# Patient Record
Sex: FEMALE | Race: WHITE | Employment: FULL TIME | URBAN - METROPOLITAN AREA
[De-identification: names, ages, dates, MRNs, and addresses within clinical notes are randomized per-mention and may not be internally consistent; named-entity substitution may affect disease eponyms.]

---

## 2022-11-22 ENCOUNTER — APPOINTMENT (OUTPATIENT)
Dept: GENERAL RADIOLOGY | Age: 22
End: 2022-11-22

## 2022-11-22 ENCOUNTER — HOSPITAL ENCOUNTER (EMERGENCY)
Age: 22
Discharge: ANOTHER HOSPITAL | End: 2022-11-23
Attending: EMERGENCY MEDICINE

## 2022-11-22 DIAGNOSIS — E05.91 THYROTOXICOSIS WITH THYROTOXIC CRISIS, UNSPECIFIED THYROTOXICOSIS TYPE: Primary | ICD-10-CM

## 2022-11-22 DIAGNOSIS — E87.6 HYPOKALEMIA: ICD-10-CM

## 2022-11-22 LAB
ALBUMIN SERPL-MCNC: 3.9 G/DL (ref 3.5–5.2)
ALP BLD-CCNC: 123 U/L (ref 35–104)
ALT SERPL-CCNC: 18 U/L (ref 0–32)
ANION GAP SERPL CALCULATED.3IONS-SCNC: 15 MMOL/L (ref 7–16)
AST SERPL-CCNC: 17 U/L (ref 0–31)
BASOPHILS ABSOLUTE: 0.04 E9/L (ref 0–0.2)
BASOPHILS RELATIVE PERCENT: 0.4 % (ref 0–2)
BILIRUB SERPL-MCNC: 0.4 MG/DL (ref 0–1.2)
BUN BLDV-MCNC: 12 MG/DL (ref 6–20)
CALCIUM SERPL-MCNC: 10 MG/DL (ref 8.6–10.2)
CHLORIDE BLD-SCNC: 103 MMOL/L (ref 98–107)
CO2: 19 MMOL/L (ref 22–29)
CREAT SERPL-MCNC: 0.4 MG/DL (ref 0.5–1)
EOSINOPHILS ABSOLUTE: 0.1 E9/L (ref 0.05–0.5)
EOSINOPHILS RELATIVE PERCENT: 1 % (ref 0–6)
GFR SERPL CREATININE-BSD FRML MDRD: >60 ML/MIN/1.73
GLUCOSE BLD-MCNC: 96 MG/DL (ref 74–99)
HCT VFR BLD CALC: 37.5 % (ref 34–48)
HEMOGLOBIN: 12.6 G/DL (ref 11.5–15.5)
IMMATURE GRANULOCYTES #: 0.03 E9/L
IMMATURE GRANULOCYTES %: 0.3 % (ref 0–5)
LYMPHOCYTES ABSOLUTE: 3.82 E9/L (ref 1.5–4)
LYMPHOCYTES RELATIVE PERCENT: 38.9 % (ref 20–42)
MAGNESIUM: 1.7 MG/DL (ref 1.6–2.6)
MCH RBC QN AUTO: 25.8 PG (ref 26–35)
MCHC RBC AUTO-ENTMCNC: 33.6 % (ref 32–34.5)
MCV RBC AUTO: 76.8 FL (ref 80–99.9)
MONOCYTES ABSOLUTE: 1.25 E9/L (ref 0.1–0.95)
MONOCYTES RELATIVE PERCENT: 12.7 % (ref 2–12)
NEUTROPHILS ABSOLUTE: 4.57 E9/L (ref 1.8–7.3)
NEUTROPHILS RELATIVE PERCENT: 46.7 % (ref 43–80)
PDW BLD-RTO: 13.1 FL (ref 11.5–15)
PLATELET # BLD: 315 E9/L (ref 130–450)
PMV BLD AUTO: 11 FL (ref 7–12)
POTASSIUM SERPL-SCNC: 2.7 MMOL/L (ref 3.5–5)
PRO-BNP: 178 PG/ML (ref 0–125)
RBC # BLD: 4.88 E12/L (ref 3.5–5.5)
SODIUM BLD-SCNC: 137 MMOL/L (ref 132–146)
T4 FREE: 7.03 NG/DL (ref 0.93–1.7)
TOTAL PROTEIN: 7.6 G/DL (ref 6.4–8.3)
TROPONIN, HIGH SENSITIVITY: <6 NG/L (ref 0–9)
TSH SERPL DL<=0.05 MIU/L-ACNC: <0.01 UIU/ML (ref 0.27–4.2)
WBC # BLD: 9.8 E9/L (ref 4.5–11.5)

## 2022-11-22 PROCEDURE — 93005 ELECTROCARDIOGRAM TRACING: CPT | Performed by: EMERGENCY MEDICINE

## 2022-11-22 PROCEDURE — 83735 ASSAY OF MAGNESIUM: CPT

## 2022-11-22 PROCEDURE — 85025 COMPLETE CBC W/AUTO DIFF WBC: CPT

## 2022-11-22 PROCEDURE — 71045 X-RAY EXAM CHEST 1 VIEW: CPT

## 2022-11-22 PROCEDURE — 80053 COMPREHEN METABOLIC PANEL: CPT

## 2022-11-22 PROCEDURE — 96361 HYDRATE IV INFUSION ADD-ON: CPT

## 2022-11-22 PROCEDURE — 84484 ASSAY OF TROPONIN QUANT: CPT

## 2022-11-22 PROCEDURE — 6370000000 HC RX 637 (ALT 250 FOR IP): Performed by: EMERGENCY MEDICINE

## 2022-11-22 PROCEDURE — 83880 ASSAY OF NATRIURETIC PEPTIDE: CPT

## 2022-11-22 PROCEDURE — 6360000002 HC RX W HCPCS: Performed by: EMERGENCY MEDICINE

## 2022-11-22 PROCEDURE — 84443 ASSAY THYROID STIM HORMONE: CPT

## 2022-11-22 PROCEDURE — 96376 TX/PRO/DX INJ SAME DRUG ADON: CPT

## 2022-11-22 PROCEDURE — 84439 ASSAY OF FREE THYROXINE: CPT

## 2022-11-22 PROCEDURE — 2580000003 HC RX 258: Performed by: EMERGENCY MEDICINE

## 2022-11-22 PROCEDURE — 36415 COLL VENOUS BLD VENIPUNCTURE: CPT

## 2022-11-22 PROCEDURE — 96365 THER/PROPH/DIAG IV INF INIT: CPT

## 2022-11-22 PROCEDURE — 99285 EMERGENCY DEPT VISIT HI MDM: CPT

## 2022-11-22 PROCEDURE — 96375 TX/PRO/DX INJ NEW DRUG ADDON: CPT

## 2022-11-22 RX ORDER — 0.9 % SODIUM CHLORIDE 0.9 %
1000 INTRAVENOUS SOLUTION INTRAVENOUS ONCE
Status: COMPLETED | OUTPATIENT
Start: 2022-11-22 | End: 2022-11-22

## 2022-11-22 RX ORDER — POTASSIUM CHLORIDE 7.45 MG/ML
10 INJECTION INTRAVENOUS
Status: DISPENSED | OUTPATIENT
Start: 2022-11-22 | End: 2022-11-23

## 2022-11-22 RX ORDER — PROPYLTHIOURACIL 50 MG/1
200 TABLET ORAL ONCE
Status: COMPLETED | OUTPATIENT
Start: 2022-11-22 | End: 2022-11-23

## 2022-11-22 RX ORDER — POTASSIUM CHLORIDE 20 MEQ/1
40 TABLET, EXTENDED RELEASE ORAL ONCE
Status: COMPLETED | OUTPATIENT
Start: 2022-11-22 | End: 2022-11-22

## 2022-11-22 RX ORDER — ONDANSETRON 2 MG/ML
4 INJECTION INTRAMUSCULAR; INTRAVENOUS ONCE
Status: COMPLETED | OUTPATIENT
Start: 2022-11-22 | End: 2022-11-22

## 2022-11-22 RX ADMIN — ONDANSETRON 4 MG: 2 INJECTION INTRAMUSCULAR; INTRAVENOUS at 23:59

## 2022-11-22 RX ADMIN — POTASSIUM CHLORIDE 10 MEQ: 7.46 INJECTION, SOLUTION INTRAVENOUS at 23:33

## 2022-11-22 RX ADMIN — POTASSIUM CHLORIDE 40 MEQ: 1500 TABLET, EXTENDED RELEASE ORAL at 22:17

## 2022-11-22 RX ADMIN — SODIUM CHLORIDE 1000 ML: 9 INJECTION, SOLUTION INTRAVENOUS at 21:26

## 2022-11-22 RX ADMIN — POTASSIUM CHLORIDE 10 MEQ: 7.46 INJECTION, SOLUTION INTRAVENOUS at 22:17

## 2022-11-22 ASSESSMENT — ENCOUNTER SYMPTOMS
SHORTNESS OF BREATH: 0
BACK PAIN: 0
COUGH: 0
ABDOMINAL PAIN: 0
VOMITING: 0
NAUSEA: 1
COLOR CHANGE: 0
DIARRHEA: 1
RHINORRHEA: 0
BLOOD IN STOOL: 0

## 2022-11-22 ASSESSMENT — LIFESTYLE VARIABLES
HOW MANY STANDARD DRINKS CONTAINING ALCOHOL DO YOU HAVE ON A TYPICAL DAY: PATIENT DOES NOT DRINK
HOW OFTEN DO YOU HAVE A DRINK CONTAINING ALCOHOL: NEVER

## 2022-11-23 ENCOUNTER — APPOINTMENT (OUTPATIENT)
Dept: ULTRASOUND IMAGING | Age: 22
DRG: 645 | End: 2022-11-23
Attending: STUDENT IN AN ORGANIZED HEALTH CARE EDUCATION/TRAINING PROGRAM

## 2022-11-23 ENCOUNTER — HOSPITAL ENCOUNTER (INPATIENT)
Age: 22
LOS: 1 days | Discharge: HOME | DRG: 645 | End: 2022-11-24
Attending: STUDENT IN AN ORGANIZED HEALTH CARE EDUCATION/TRAINING PROGRAM | Admitting: STUDENT IN AN ORGANIZED HEALTH CARE EDUCATION/TRAINING PROGRAM

## 2022-11-23 VITALS
OXYGEN SATURATION: 99 % | DIASTOLIC BLOOD PRESSURE: 79 MMHG | SYSTOLIC BLOOD PRESSURE: 134 MMHG | HEIGHT: 56 IN | TEMPERATURE: 98.4 F | HEART RATE: 120 BPM | BODY MASS INDEX: 36.89 KG/M2 | RESPIRATION RATE: 18 BRPM | WEIGHT: 164 LBS

## 2022-11-23 PROBLEM — E05.90 THYROTOXICOSIS, ACUTE: Status: ACTIVE | Noted: 2022-11-23

## 2022-11-23 PROBLEM — E05.80 OTHER THYROTOXICOSIS WITHOUT THYROTOXIC CRISIS OR STORM: Status: ACTIVE | Noted: 2022-11-23

## 2022-11-23 LAB
AMPHETAMINE SCREEN, URINE: NOT DETECTED
ANION GAP SERPL CALCULATED.3IONS-SCNC: 10 MMOL/L (ref 7–16)
BARBITURATE SCREEN URINE: NOT DETECTED
BENZODIAZEPINE SCREEN, URINE: NOT DETECTED
BILIRUBIN URINE: NEGATIVE
BLOOD, URINE: NEGATIVE
BUN BLDV-MCNC: 6 MG/DL (ref 6–20)
CALCIUM SERPL-MCNC: 9.8 MG/DL (ref 8.6–10.2)
CANNABINOID SCREEN URINE: POSITIVE
CHLORIDE BLD-SCNC: 108 MMOL/L (ref 98–107)
CLARITY: CLEAR
CO2: 20 MMOL/L (ref 22–29)
COCAINE METABOLITE SCREEN URINE: NOT DETECTED
COLOR: YELLOW
CREAT SERPL-MCNC: 0.3 MG/DL (ref 0.5–1)
EKG ATRIAL RATE: 149 BPM
EKG P AXIS: 56 DEGREES
EKG P-R INTERVAL: 132 MS
EKG Q-T INTERVAL: 266 MS
EKG QRS DURATION: 80 MS
EKG QTC CALCULATION (BAZETT): 418 MS
EKG R AXIS: 6 DEGREES
EKG T AXIS: 43 DEGREES
EKG VENTRICULAR RATE: 149 BPM
FENTANYL SCREEN, URINE: NOT DETECTED
FERRITIN: 103 NG/ML
GFR SERPL CREATININE-BSD FRML MDRD: >60 ML/MIN/1.73
GLUCOSE BLD-MCNC: 138 MG/DL (ref 74–99)
GLUCOSE URINE: NEGATIVE MG/DL
HCG(URINE) PREGNANCY TEST: NEGATIVE
IRON SATURATION: 28 % (ref 15–50)
IRON: 90 MCG/DL (ref 37–145)
KETONES, URINE: 15 MG/DL
LEUKOCYTE ESTERASE, URINE: NEGATIVE
Lab: ABNORMAL
METHADONE SCREEN, URINE: NOT DETECTED
NITRITE, URINE: NEGATIVE
OPIATE SCREEN URINE: NOT DETECTED
OXYCODONE URINE: NOT DETECTED
PH UA: 6 (ref 5–9)
PHENCYCLIDINE SCREEN URINE: NOT DETECTED
POTASSIUM SERPL-SCNC: 3.8 MMOL/L (ref 3.5–5)
PROTEIN UA: NEGATIVE MG/DL
SODIUM BLD-SCNC: 138 MMOL/L (ref 132–146)
SPECIFIC GRAVITY UA: <=1.005 (ref 1–1.03)
TOTAL IRON BINDING CAPACITY: 316 MCG/DL (ref 250–450)
UROBILINOGEN, URINE: 0.2 E.U./DL

## 2022-11-23 PROCEDURE — 99253 IP/OBS CNSLTJ NEW/EST LOW 45: CPT | Performed by: INTERNAL MEDICINE

## 2022-11-23 PROCEDURE — 83540 ASSAY OF IRON: CPT

## 2022-11-23 PROCEDURE — 81025 URINE PREGNANCY TEST: CPT

## 2022-11-23 PROCEDURE — 6370000000 HC RX 637 (ALT 250 FOR IP): Performed by: EMERGENCY MEDICINE

## 2022-11-23 PROCEDURE — 80307 DRUG TEST PRSMV CHEM ANLYZR: CPT

## 2022-11-23 PROCEDURE — 80048 BASIC METABOLIC PNL TOTAL CA: CPT

## 2022-11-23 PROCEDURE — 83550 IRON BINDING TEST: CPT

## 2022-11-23 PROCEDURE — 99223 1ST HOSP IP/OBS HIGH 75: CPT | Performed by: STUDENT IN AN ORGANIZED HEALTH CARE EDUCATION/TRAINING PROGRAM

## 2022-11-23 PROCEDURE — 81003 URINALYSIS AUTO W/O SCOPE: CPT

## 2022-11-23 PROCEDURE — 2580000003 HC RX 258: Performed by: STUDENT IN AN ORGANIZED HEALTH CARE EDUCATION/TRAINING PROGRAM

## 2022-11-23 PROCEDURE — 36415 COLL VENOUS BLD VENIPUNCTURE: CPT

## 2022-11-23 PROCEDURE — 2060000000 HC ICU INTERMEDIATE R&B

## 2022-11-23 PROCEDURE — 6370000000 HC RX 637 (ALT 250 FOR IP): Performed by: STUDENT IN AN ORGANIZED HEALTH CARE EDUCATION/TRAINING PROGRAM

## 2022-11-23 PROCEDURE — 6360000002 HC RX W HCPCS: Performed by: EMERGENCY MEDICINE

## 2022-11-23 PROCEDURE — 6360000002 HC RX W HCPCS: Performed by: STUDENT IN AN ORGANIZED HEALTH CARE EDUCATION/TRAINING PROGRAM

## 2022-11-23 PROCEDURE — 93010 ELECTROCARDIOGRAM REPORT: CPT | Performed by: INTERNAL MEDICINE

## 2022-11-23 PROCEDURE — 76536 US EXAM OF HEAD AND NECK: CPT

## 2022-11-23 PROCEDURE — 82728 ASSAY OF FERRITIN: CPT

## 2022-11-23 RX ORDER — ACETAMINOPHEN 325 MG/1
650 TABLET ORAL EVERY 4 HOURS PRN
Status: DISCONTINUED | OUTPATIENT
Start: 2022-11-23 | End: 2022-11-24 | Stop reason: HOSPADM

## 2022-11-23 RX ORDER — ONDANSETRON 2 MG/ML
4 INJECTION INTRAMUSCULAR; INTRAVENOUS ONCE
Status: COMPLETED | OUTPATIENT
Start: 2022-11-23 | End: 2022-11-23

## 2022-11-23 RX ORDER — ENOXAPARIN SODIUM 100 MG/ML
40 INJECTION SUBCUTANEOUS DAILY
Status: DISCONTINUED | OUTPATIENT
Start: 2022-11-23 | End: 2022-11-24 | Stop reason: HOSPADM

## 2022-11-23 RX ORDER — PROPYLTHIOURACIL 50 MG/1
100 TABLET ORAL EVERY 6 HOURS
Status: DISCONTINUED | OUTPATIENT
Start: 2022-11-23 | End: 2022-11-23 | Stop reason: HOSPADM

## 2022-11-23 RX ORDER — PROPYLTHIOURACIL 50 MG/1
100 TABLET ORAL EVERY 6 HOURS
Status: DISCONTINUED | OUTPATIENT
Start: 2022-11-23 | End: 2022-11-24

## 2022-11-23 RX ORDER — ONDANSETRON 2 MG/ML
4 INJECTION INTRAMUSCULAR; INTRAVENOUS EVERY 6 HOURS PRN
Status: DISCONTINUED | OUTPATIENT
Start: 2022-11-23 | End: 2022-11-24 | Stop reason: HOSPADM

## 2022-11-23 RX ORDER — PROPRANOLOL HYDROCHLORIDE 40 MG/1
60 TABLET ORAL EVERY 6 HOURS
Status: DISCONTINUED | OUTPATIENT
Start: 2022-11-23 | End: 2022-11-24 | Stop reason: HOSPADM

## 2022-11-23 RX ORDER — PROPRANOLOL HYDROCHLORIDE 20 MG/1
60 TABLET ORAL EVERY 6 HOURS
Status: DISCONTINUED | OUTPATIENT
Start: 2022-11-23 | End: 2022-11-23 | Stop reason: HOSPADM

## 2022-11-23 RX ADMIN — ONDANSETRON 4 MG: 2 INJECTION INTRAMUSCULAR; INTRAVENOUS at 04:06

## 2022-11-23 RX ADMIN — PROPRANOLOL HYDROCHLORIDE 60 MG: 40 TABLET ORAL at 11:46

## 2022-11-23 RX ADMIN — PROPRANOLOL HYDROCHLORIDE 60 MG: 20 TABLET ORAL at 01:05

## 2022-11-23 RX ADMIN — PROPRANOLOL HYDROCHLORIDE 60 MG: 40 TABLET ORAL at 17:47

## 2022-11-23 RX ADMIN — SODIUM CHLORIDE 100 MG: 9 INJECTION, SOLUTION INTRAVENOUS at 14:46

## 2022-11-23 RX ADMIN — PROPYLTHIOURACIL 100 MG: 50 TABLET ORAL at 11:46

## 2022-11-23 RX ADMIN — POTASSIUM CHLORIDE 10 MEQ: 7.46 INJECTION, SOLUTION INTRAVENOUS at 01:47

## 2022-11-23 RX ADMIN — PROPYLTHIOURACIL 200 MG: 50 TABLET ORAL at 01:04

## 2022-11-23 RX ADMIN — ACETAMINOPHEN 650 MG: 325 TABLET ORAL at 22:00

## 2022-11-23 RX ADMIN — SODIUM CHLORIDE 100 MG: 9 INJECTION, SOLUTION INTRAVENOUS at 22:00

## 2022-11-23 RX ADMIN — PROPYLTHIOURACIL 100 MG: 50 TABLET ORAL at 17:47

## 2022-11-23 ASSESSMENT — PAIN SCALES - GENERAL
PAINLEVEL_OUTOF10: 0
PAINLEVEL_OUTOF10: 7
PAINLEVEL_OUTOF10: 0
PAINLEVEL_OUTOF10: 2
PAINLEVEL_OUTOF10: 0

## 2022-11-23 ASSESSMENT — PAIN DESCRIPTION - FREQUENCY: FREQUENCY: INTERMITTENT

## 2022-11-23 ASSESSMENT — PAIN DESCRIPTION - ONSET: ONSET: GRADUAL

## 2022-11-23 ASSESSMENT — PAIN DESCRIPTION - PAIN TYPE: TYPE: ACUTE PAIN

## 2022-11-23 ASSESSMENT — PAIN - FUNCTIONAL ASSESSMENT: PAIN_FUNCTIONAL_ASSESSMENT: ACTIVITIES ARE NOT PREVENTED

## 2022-11-23 ASSESSMENT — PAIN DESCRIPTION - DESCRIPTORS: DESCRIPTORS: ACHING;STABBING;THROBBING

## 2022-11-23 ASSESSMENT — PAIN DESCRIPTION - LOCATION: LOCATION: HEAD;EYE

## 2022-11-23 NOTE — H&P
[facility] History and Physical      CHIEF COMPLAINT:  Palpitations     History of Present Illness:      20 year female with a past medical history of asthma presented to the ED for palpitations. She states she began to have palpitation in March. Palpitations began when she stopped using her vape so patient believed they were secondary to that. At the end of September her eye began to bulge. Also having lightheadedness, weight loss and diaphoresis. Has lost unintentionally lost 30 pounds in 2 months. She admits to using cannabis daily. Denies any other blood abuse. Denies alcohol abuse. Admits to smoking vape for 3-4 years and quit in March. Admits to family history of thyroid disease in mother, aunt and grandmother. Drinks 1 red bull or 1 cup of coffee per day. No known allergies. Informant(s) for H&P: patient and EMR     REVIEW OF SYSTEMS:  A comprehensive review of systems was negative except for: what is in the HPI    PMH:  Past Medical History:   Diagnosis Date    Asthma        Surgical History:  No past surgical history on file. Medications Prior to Admission:    Prior to Admission medications    Not on File       Allergies:    Patient has no known allergies. Social History:    reports that she has never smoked. She has never used smokeless tobacco. She reports that she does not currently use alcohol. She reports current drug use. Drug: Marijuana Gonzalez [place]ogo). Family History:   family history includes Thyroid Disease in her maternal grandmother and mother.        PHYSICAL EXAM:  Vitals:  BP (!) 155/85   Pulse (!) 116   Temp 98.3 °F (36.8 °C) (Oral)   Resp 18   SpO2 100%   General Appearance: alert and oriented to person, place and time and in no acute distress  Skin: warm and dry  Head: normocephalic and atraumatic  Eyes: pupils equal, round, and reactive to light, extraocular eye movements intact, conjunctivae normal  Neck: neck supple and non tender without mass Pulmonary/Chest: clear to auscultation bilaterally- no wheezes, rales or rhonchi, normal air movement, no respiratory distress  Cardiovascular: normal rate, normal S1 and S2 and no carotid bruits  Abdomen: soft, non-tender, non-distended, normal bowel sounds, no masses or organomegaly  Extremities: no cyanosis, no clubbing and no edema  Neurologic: no cranial nerve deficit and speech normal        LABS:  Recent Labs     11/22/22 2134 11/23/22  1058    138   K 2.7* 3.8    108*   CO2 19* 20*   BUN 12 6   CREATININE 0.4* 0.3*   GLUCOSE 96 138*   CALCIUM 10.0 9.8       Recent Labs     11/22/22 2134   WBC 9.8   RBC 4.88   HGB 12.6   HCT 37.5   MCV 76.8*   MCH 25.8*   MCHC 33.6   RDW 13.1      MPV 11.0       No results for input(s): POCGLU in the last 72 hours. Radiology:   No orders to display       EKG per ED:   Sinus tachycardia  Possible Left atrial enlargement  Left ventricular hypertrophy with repolarization abnormality  Abnormal ECG  No previous ECGs available    ASSESSMENT:      Principal Problem:    Thyrotoxicosis, acute  Resolved Problems:    * No resolved hospital problems. *      PLAN:    1. Thyrotoxicosis: Pro-. TSH <0.010. T4 elevated at 7.03. UA with with ketones otherwise unremarkable. UDS positive for cannabis. Urine pregnancy test negative. Endocrinology consulted from the ED. Started on Propranolol 60 mg q6h, Solucortef 100 mg q8h, and  mg loading dose followed by 100 mg q6h. Recheck TSH and T4.     2. Hypokalemia: Resolved. Supplement prn. Monitor. 3. Hx of asthma: Not currently in exacerbation     4. Microcytic anemia: MCV 76.8. MCH 25.8. Iron studies ordered. Monitor H&H.      5. Cannabis use: UDS positive. Admits to daily use. Code Status: Full   DVT prophylaxis: Lovenox       NOTE: This report was transcribed using voice recognition software.  Every effort was made to ensure accuracy; however, inadvertent computerized transcription errors may be present. Electronically signed by Isabelle Adorno PA-C on 11/23/2022 at 1:02 PM     45 minutes time spent reviewing patient chart, assessing patient, discussing plan of care with patient and family, discussing plan of care with collaborating physician, and charting.

## 2022-11-23 NOTE — ED PROVIDER NOTES
ED PROVIDER NOTE    Chief Complaint   Patient presents with    Tachycardia       HPI:  11/22/22,   Time: 9:27 PM RANDOLPH Blackman is a 20 y.o. female presenting to the ED for palpitations. Ongoing for 2 months, gradual onset, progressively worsening, moderate in severity, no aggravating/alleviating factors. Also with associated left eye bulging which she noticed today. Associated diarrhea today. Associated lightheadedness, weight loss, and diaphoresis. Has lost 30 pounds in the past 2 months unintentionally. +Fhx of thyroid disease. No fever, chills, cough, chest pain, shortness of breath, abdominal pain, vomiting. Normal po intake and urine output. Uses cannabis and drinks one red bull a day. No other drug/etoh/caffeine use. Chart review: hx of asthma    Review of Systems:     Review of Systems   Constitutional:  Positive for unexpected weight change. Negative for appetite change, chills and fever. HENT:  Negative for congestion and rhinorrhea. Eyes:  Negative for visual disturbance. Respiratory:  Negative for cough and shortness of breath. Cardiovascular:  Positive for palpitations. Negative for chest pain. Gastrointestinal:  Positive for diarrhea and nausea. Negative for abdominal pain, blood in stool and vomiting. Genitourinary:  Negative for decreased urine volume and difficulty urinating. Musculoskeletal:  Negative for back pain and neck pain. Skin:  Negative for color change. Neurological:  Positive for light-headedness. Negative for dizziness, syncope, weakness, numbness and headaches.       --------------------------------------------- PAST HISTORY ---------------------------------------------  Past Medical History:   Past Medical History:   Diagnosis Date    Asthma        Past Surgical History:   History reviewed. No pertinent surgical history.     Social History:   Social History     Socioeconomic History    Marital status: Single     Spouse name: None    Number of children: None    Years of education: None    Highest education level: None   Tobacco Use    Smoking status: Never    Smokeless tobacco: Never   Substance and Sexual Activity    Alcohol use: Not Currently    Drug use: Yes     Types: Marijuana Macy Ege)    Sexual activity: Yes     Partners: Male       Family History:   History reviewed. No pertinent family history. The patients home medications have been reviewed. Allergies:   No Known Allergies        ---------------------------------------------------PHYSICAL EXAM--------------------------------------    BP (!) 192/100   Pulse (!) 163   Temp 98.1 °F (36.7 °C) (Oral)   Resp 24   Ht 4' 8\" (1.422 m)   Wt 164 lb (74.4 kg)   SpO2 99%   BMI 36.77 kg/m²     Physical Exam  Vitals and nursing note reviewed. Constitutional:       General: She is not in acute distress. Appearance: She is not toxic-appearing. HENT:      Mouth/Throat:      Mouth: Mucous membranes are moist.   Eyes:      General: No scleral icterus. Extraocular Movements: Extraocular movements intact. Pupils: Pupils are equal, round, and reactive to light. Comments: Left eye proptosis   Neck:      Comments: No JVD  Cardiovascular:      Rate and Rhythm: Normal rate and regular rhythm. Pulses: Normal pulses. Heart sounds: Normal heart sounds. No murmur heard. Pulmonary:      Effort: Pulmonary effort is normal. No respiratory distress. Breath sounds: Normal breath sounds. No wheezing or rales. Abdominal:      General: There is no distension. Palpations: Abdomen is soft. Tenderness: There is no abdominal tenderness. Musculoskeletal:         General: No swelling or tenderness. Normal range of motion. Cervical back: Normal range of motion and neck supple. No rigidity. Comments: Radial, DP, and PT pulses 2+ bilaterally. Skin:     General: Skin is warm and dry.    Neurological:      Mental Status: She is alert and oriented to person, place, and time.      Comments: Strength 5/5 and sensation grossly intact to light touch and equal bilaterally throughout all extremities          -------------------------------------------------- RESULTS -------------------------------------------------  I have personally reviewed all laboratory and imaging results for this patient. Results are listed below. LABS:  Labs Reviewed   COMPREHENSIVE METABOLIC PANEL - Abnormal; Notable for the following components:       Result Value    Potassium 2.7 (*)     CO2 19 (*)     Creatinine 0.4 (*)     Alkaline Phosphatase 123 (*)     All other components within normal limits    Narrative:     Radha Mcneil tel. [phone],  Chemistry results called to and read back by [address] [place] [address], 11/22/2022 22:06,  by Negra Menard   CBC WITH AUTO DIFFERENTIAL - Abnormal; Notable for the following components:    MCV 76.8 (*)     MCH 25.8 (*)     Monocytes % 12.7 (*)     Monocytes Absolute 1.25 (*)     All other components within normal limits   TSH - Abnormal; Notable for the following components:    TSH <0.010 (*)     All other components within normal limits   T4, FREE - Abnormal; Notable for the following components:    T4 Free 7.03 (*)     All other components within normal limits   BRAIN NATRIURETIC PEPTIDE - Abnormal; Notable for the following components:    Pro- (*)     All other components within normal limits   MAGNESIUM    Narrative:     Radha Mcneil tel. [phone],  Chemistry results called to and read back by [facility] RN, 11/22/2022 22:06,  by Negra Menard   TROPONIN   PREGNANCY, URINE       RADIOLOGY:  Interpreted personally and by Radiologist.  XR CHEST PORTABLE   Final Result   No acute process. EKG:  This EKG is signed and interpreted by the EP.     Sinus tachycardia, vent rate 149bpm, normal axis and intervals, ST elevation in aVR and V1 with diffuse ST depression in inferior and anterolateral leads, no prior EKG on file for comparison       ------------------------- NURSING NOTES AND VITALS REVIEWED ---------------------------   The nursing notes within the ED encounter and vital signs as below have been reviewed by myself. BP (!) 192/100   Pulse (!) 163   Temp 98.1 °F (36.7 °C) (Oral)   Resp 24   Ht 4' 8\" (1.422 m)   Wt 164 lb (74.4 kg)   SpO2 99%   BMI 36.77 kg/m²   Oxygen Saturation Interpretation: Normal    The patients available past medical records and past encounters were reviewed. ------------------------------ ED COURSE/MEDICAL DECISION MAKING----------------------  Medications   potassium chloride 10 mEq/100 mL IVPB (Peripheral Line) (10 mEq IntraVENous New Bag 11/23/22 0147)   hydrocortisone sodium succinate PF (SOLU-CORTEF) 100 mg in sodium chloride 0.9 % 50 mL IVPB (0 mg IntraVENous Stopped 11/23/22 0148)   propranolol (INDERAL) tablet 60 mg (60 mg Oral Given 11/23/22 0105)   propylthiouracil (PTU) tablet 100 mg (has no administration in time range)   hydrocortisone sodium succinate PF (SOLU-CORTEF) 250 MG injection (  Not Given 11/23/22 0113)   0.9 % sodium chloride bolus (0 mLs IntraVENous Stopped 11/22/22 2331)   potassium chloride (KLOR-CON M) extended release tablet 40 mEq (40 mEq Oral Given 11/22/22 2217)   ondansetron (ZOFRAN) injection 4 mg (4 mg IntraVENous Given 11/22/22 2359)   propylthiouracil (PTU) tablet 200 mg (200 mg Oral Given 11/23/22 0104)       Consultations:             Endocrinology Dr. Joseph Finely - agrees with plan for admission and treatment of thyroid storm  Recommends: propranolol 60mg q6h, solucortef 100mg q8h, and PTU 200mg loading dose now followed by 100mg q6h    Critical Care: Please note that the withdrawal or failure to initiate urgent interventions for this patient would likely result in a life threatening deterioration or permanent disability. Accordingly this patient received 30 minutes of critical care time, excluding separately billable procedures. Counseling:    The emergency provider has spoken with the patient and discussed todays results, in addition to providing specific details for the plan of care and counseling regarding the diagnosis and prognosis. Questions are answered at this time and they are agreeable with the plan. ED Course/Medical Decision Makin y.o. female here with palpitations. Tachycardic and hypertensive on arrival, improving throughout ED course with treatment. History and exam concerning for thyrotoxicosis. Labs notable for hypokalemia and low TSH with elevated free T4. No hyperthermia or features of cardiopulmonary or hepatic dysfunction. Started treatment with propranolol and PTU. Discussed w/ endocrinology, recs as above. HR downtrending further after tx initiated. Accepted by hospitalist Dr. Rupesh Arzola and pending transport for admission to [facility] hospitalist service.       --------------------------------- IMPRESSION AND DISPOSITION ---------------------------------    IMPRESSION  1. Thyrotoxicosis with thyrotoxic crisis, unspecified thyrotoxicosis type    2. Hypokalemia        DISPOSITION  Disposition: Transfer to [email or web address] to telemetry unit  Patient condition is stable    NOTE: This report was transcribed using voice recognition software.  Every effort was made to ensure accuracy; however, inadvertent computerized transcription errors may be present    Binu Stinson MD  Attending Emergency Physician        Binu Stinson MD  [phone]

## 2022-11-24 VITALS
WEIGHT: 167.5 LBS | RESPIRATION RATE: 16 BRPM | OXYGEN SATURATION: 99 % | SYSTOLIC BLOOD PRESSURE: 143 MMHG | DIASTOLIC BLOOD PRESSURE: 85 MMHG | BODY MASS INDEX: 37.55 KG/M2 | TEMPERATURE: 98.1 F | HEART RATE: 96 BPM

## 2022-11-24 LAB
ANION GAP SERPL CALCULATED.3IONS-SCNC: 12 MMOL/L (ref 7–16)
BUN BLDV-MCNC: 7 MG/DL (ref 6–20)
CALCIUM SERPL-MCNC: 9.7 MG/DL (ref 8.6–10.2)
CHLORIDE BLD-SCNC: 105 MMOL/L (ref 98–107)
CO2: 21 MMOL/L (ref 22–29)
CREAT SERPL-MCNC: 0.3 MG/DL (ref 0.5–1)
GFR SERPL CREATININE-BSD FRML MDRD: >60 ML/MIN/1.73
GLUCOSE BLD-MCNC: 120 MG/DL (ref 74–99)
HBA1C MFR BLD: 5.4 % (ref 4–5.6)
HCT VFR BLD CALC: 35.2 % (ref 34–48)
HEMOGLOBIN: 11.8 G/DL (ref 11.5–15.5)
MCH RBC QN AUTO: 26.5 PG (ref 26–35)
MCHC RBC AUTO-ENTMCNC: 33.5 % (ref 32–34.5)
MCV RBC AUTO: 79.1 FL (ref 80–99.9)
PDW BLD-RTO: 13 FL (ref 11.5–15)
PLATELET # BLD: 244 E9/L (ref 130–450)
PMV BLD AUTO: 10.9 FL (ref 7–12)
POTASSIUM SERPL-SCNC: 3.7 MMOL/L (ref 3.5–5)
RBC # BLD: 4.45 E12/L (ref 3.5–5.5)
SODIUM BLD-SCNC: 138 MMOL/L (ref 132–146)
T4 FREE: 5.31 NG/DL (ref 0.93–1.7)
T4 TOTAL: 23.9 MCG/DL (ref 4.5–11.7)
TSH SERPL DL<=0.05 MIU/L-ACNC: <0.01 UIU/ML (ref 0.27–4.2)
WBC # BLD: 3.9 E9/L (ref 4.5–11.5)

## 2022-11-24 PROCEDURE — 99232 SBSQ HOSP IP/OBS MODERATE 35: CPT | Performed by: INTERNAL MEDICINE

## 2022-11-24 PROCEDURE — 84443 ASSAY THYROID STIM HORMONE: CPT

## 2022-11-24 PROCEDURE — 84439 ASSAY OF FREE THYROXINE: CPT

## 2022-11-24 PROCEDURE — 2580000003 HC RX 258: Performed by: STUDENT IN AN ORGANIZED HEALTH CARE EDUCATION/TRAINING PROGRAM

## 2022-11-24 PROCEDURE — 84445 ASSAY OF TSI GLOBULIN: CPT

## 2022-11-24 PROCEDURE — 85027 COMPLETE CBC AUTOMATED: CPT

## 2022-11-24 PROCEDURE — 6370000000 HC RX 637 (ALT 250 FOR IP): Performed by: STUDENT IN AN ORGANIZED HEALTH CARE EDUCATION/TRAINING PROGRAM

## 2022-11-24 PROCEDURE — 6370000000 HC RX 637 (ALT 250 FOR IP): Performed by: INTERNAL MEDICINE

## 2022-11-24 PROCEDURE — 80048 BASIC METABOLIC PNL TOTAL CA: CPT

## 2022-11-24 PROCEDURE — 86800 THYROGLOBULIN ANTIBODY: CPT

## 2022-11-24 PROCEDURE — 86376 MICROSOMAL ANTIBODY EACH: CPT

## 2022-11-24 PROCEDURE — 36415 COLL VENOUS BLD VENIPUNCTURE: CPT

## 2022-11-24 PROCEDURE — 83036 HEMOGLOBIN GLYCOSYLATED A1C: CPT

## 2022-11-24 PROCEDURE — 6360000002 HC RX W HCPCS: Performed by: STUDENT IN AN ORGANIZED HEALTH CARE EDUCATION/TRAINING PROGRAM

## 2022-11-24 RX ORDER — METHIMAZOLE 10 MG/1
10 TABLET ORAL ONCE
Status: COMPLETED | OUTPATIENT
Start: 2022-11-24 | End: 2022-11-24

## 2022-11-24 RX ORDER — METHIMAZOLE 10 MG/1
20 TABLET ORAL 2 TIMES DAILY
Status: DISCONTINUED | OUTPATIENT
Start: 2022-11-24 | End: 2022-11-24 | Stop reason: HOSPADM

## 2022-11-24 RX ORDER — HYDROCORTISONE 20 MG/1
40 TABLET ORAL 2 TIMES DAILY
Status: DISCONTINUED | OUTPATIENT
Start: 2022-11-24 | End: 2022-11-24 | Stop reason: HOSPADM

## 2022-11-24 RX ORDER — PROPRANOLOL HCL 60 MG
60 CAPSULE, EXTENDED RELEASE 24HR ORAL DAILY
Qty: 30 CAPSULE | Refills: 3 | Status: SHIPPED | OUTPATIENT
Start: 2022-11-24

## 2022-11-24 RX ORDER — METHIMAZOLE 10 MG/1
20 TABLET ORAL 2 TIMES DAILY
Qty: 120 TABLET | Refills: 1 | Status: SHIPPED | OUTPATIENT
Start: 2022-11-24

## 2022-11-24 RX ADMIN — PROPRANOLOL HYDROCHLORIDE 60 MG: 40 TABLET ORAL at 06:10

## 2022-11-24 RX ADMIN — PROPYLTHIOURACIL 100 MG: 50 TABLET ORAL at 01:15

## 2022-11-24 RX ADMIN — SODIUM CHLORIDE 100 MG: 9 INJECTION, SOLUTION INTRAVENOUS at 06:10

## 2022-11-24 RX ADMIN — METHIMAZOLE 10 MG: 10 TABLET ORAL at 12:14

## 2022-11-24 RX ADMIN — PROPRANOLOL HYDROCHLORIDE 60 MG: 40 TABLET ORAL at 01:15

## 2022-11-24 RX ADMIN — PROPRANOLOL HYDROCHLORIDE 60 MG: 40 TABLET ORAL at 11:40

## 2022-11-24 RX ADMIN — HYDROCORTISONE 40 MG: 20 TABLET ORAL at 07:52

## 2022-11-24 RX ADMIN — METHIMAZOLE 20 MG: 10 TABLET ORAL at 07:52

## 2022-11-24 RX ADMIN — PROPYLTHIOURACIL 100 MG: 50 TABLET ORAL at 06:10

## 2022-11-24 ASSESSMENT — PAIN SCALES - GENERAL: PAINLEVEL_OUTOF10: 0

## 2022-11-24 NOTE — PROGRESS NOTES
ENDOCRINOLOGY ROGRESS NOTE    Date of Admission: 11/23/2022  Date of Service: 11/24/2022  Admitting Physician: Nestor Toure MD   Primary Care Physician: No primary care provider on file. Consultant physician: Elle Celeste MD     Reason for the consultation: thyrotoxicosis    History of Present Illness: The history is provided by the patient. Accuracy of the patient data is excellent. Cici Cox is a very pleasant 20 y.o. old female with PMH of asthma admitted to [facility] on 11/23/2022 because of palpitations, endocrine service was consulted for management of hyperthyroidism     Subjective:  Seen and examined this AM, feels much better, no acute events    Scheduled Meds:   methIMAzole  20 mg Oral BID    hydrocortisone  40 mg Oral BID    enoxaparin  40 mg SubCUTAneous Daily    propranolol  60 mg Oral Q6H     PRN Meds:   ondansetron, 4 mg, Q6H PRN  acetaminophen, 650 mg, Q4H PRN    Review of Systems  All systems reviewed. All negative except for symptoms mentioned in HPI     OBJECTIVE    BP (!) 143/85   Pulse 96   Temp 98.1 °F (36.7 °C) (Oral)   Resp 16   Wt 167 lb 8 oz (76 kg)   SpO2 99%   BMI 37.55 kg/m²   Wt Readings from Last 6 Encounters:   11/24/22 167 lb 8 oz (76 kg)   11/22/22 164 lb (74.4 kg)       Physical examination:  General: awake alert, oriented x3, no abnormal position or movements. HEENT: normocephalic non traumatic, +  exophthalmos, +  lid lag, +  lid retraction   Neck: supple, no LN enlargement,  no thyroid tenderness, +  thyroid bruit, no JVD. Pulm: good equal air entry no added sounds   CVS: S1 + S2   Abd: soft lax, no tenderness,   Skin: warm, no lesions, no rash.  No palmar erythema, no onycholysis, no pretibial Myxoedema,   Musculoskeletal: No back tenderness, no kyphosis/scoliosis    Neuro: CN intact, sensation notmal , muscle power normal. +  tremors   Psych: normal mood, and affect    Review of Laboratory Data:  I personally reviewed the following labs:  Recent Labs 11/22/22 2134 11/24/22  0245   WBC 9.8 3.9*   RBC 4.88 4.45   HGB 12.6 11.8   HCT 37.5 35.2   MCV 76.8* 79.1*   MCH 25.8* 26.5   MCHC 33.6 33.5   RDW 13.1 13.0    244   MPV 11.0 10.9     Recent Labs     11/22/22  2134 11/23/22  1058 11/24/22  0245    138 138   K 2.7* 3.8 3.7    108* 105   CO2 19* 20* 21*   BUN 12 6 7   CREATININE 0.4* 0.3* 0.3*   GLUCOSE 96 138* 120*   CALCIUM 10.0 9.8 9.7   PROT 7.6  --   --    LABALBU 3.9  --   --    BILITOT 0.4  --   --    ALKPHOS 123*  --   --    AST 17  --   --    ALT 18  --   --      No results found for: BHYDRXBUT  No results found for: LABA1C  Lab Results   Component Value Date/Time    TSH <0.010 (L) 11/24/2022 02:45 AM    T4FREE 5.31 (H) 11/24/2022 02:45 AM    T9IHGNC 23.9 (H) 11/24/2022 02:45 AM     Lab Results   Component Value Date/Time    GLUCOSE 120 11/24/2022 02:45 AM    GLUCOSE 87 03/05/2011 12:05 PM     Lab Results   Component Value Date/Time    TRIG 52 03/05/2011 12:05 PM    HDL 39.0 03/05/2011 12:05 PM    LDLCALC 108 03/05/2011 12:05 PM    CHOL 157 03/05/2011 12:05 PM       Blood culture   No results found for: [facility]    Radiology:  US THYROID   Final Result   1. Heterogeneous, mildly enlarged, and hypervascular thyroid gland. 2.  Bilateral thyroid nodules. No nodules currently meet sonographic   criteria to recommend FNA nor follow-up based on the guidelines below. ACR TI-RADS recommendations:      TR5 (>= 7 points):  FNA if >= 1 cm; follow-up if 0.5-0.9 cm in 1, 2, 3, 4,   and 5 years      TR4 (4-6 points):  FNA if >= 1.5 cm; follow-up if 1.0-1.4 cm in 1, 2, 3, and   5 years      TR3 (3 points):  FNA if >= 2.5 cm; follow-up if 1.5-2.4 cm in 1, 3, and 5   years      TR2 (2 points):  No FNA or follow-up      TR1 (0 points):  No FNA or follow-up      ACR TI-RADS recommends that no more than two nodules with the highest ACR   TI-RADS point total should be biopsied and no more than four nodules should   be followed.              Medical Records/Labs/Images Review:   I personally reviewed and summarized previous records   All labs and imaging were reviewed independently    [address] [facility] Kyleigh Dodson, a 20 y.o.-old female seen in for management of thyrotoxicosis     Sever Thyrotoxicosis  Improving   while inpatient  we recommend the following   Methimazole 20 mg BID    Hydrocortisone 40 mg po BID while inpatient (to stop on discharge)   Continue propanolol 60 PO q6 (change to extended release 60 mg daily on discharge)   Daily free T4 while inpatient   On discharge, we recommend dc pt on Methimazole 20 mg BID, Propranolol ER 60 mg daily   Pt will follow with us shortly after discharge. Endocrine follow up visit, Thursday 12/1 at 1:00pm       Hypokalemia   Replaced     Interdisciplinary plan for communication with healthcare providers:   Consult recommendations were discussed with the Primary Service/Nursing staff      The above issues were reviewed with the patient who understood and agreed with the plan. Thank you for allowing us to participate in the care of this patient. Please do not hesitate to contact us with any additional questions. Tim Weeks MD  Endocrinologist, [facility]   [address] [place] [id number]   Phone: [phone]  Fax: [phone]  ---------------------------------  An electronic signature was used to authenticate this note.  Maira Eugenia Arguelles MD on 11/24/2022 at 7:24 AM

## 2022-11-24 NOTE — PROGRESS NOTES
From: Charmaine Stout  To: Gibson Perry  Sent: 12/11/2020 12:12 PM CST  Subject: Non-Urgent Medical Question    Dr. Perry the past couple times I saw you was because my tongue had pain and swelling and you prescribed Magic Mouthwash which did help with the pain, however I am still experiencing swelling and pain on my tongue. I am not sure who I should see about this. Would you like me to make an appt with you? ENT? or my dentist?   Spoke with Dr. Castillo Sessions regarding discharge, new orders received

## 2022-11-24 NOTE — CONSULTS
ENDOCRINOLOGY INITIAL CONSULTATION NOTE    Date of Admission: 11/23/2022  Date of Service: 11/23/2022  Admitting Physician: Alisia Cox MD   Primary Care Physician: No primary care provider on file. Consultant physician: Tyson Boxer MD     Reason for the consultation: thyrotoxicosis    History of Present Illness: The history is provided by the patient. Accuracy of the patient data is excellent. Nelly Tang is a very pleasant 20 y.o. old female with PMH of asthma admitted to [facility] on 11/23/2022 because of palpitations, endocrine service was consulted for thyroid storm. The patient was in her usual state of health until about 3 months ago when started c/o worsening palpitations, swelling in the area of the thyroid gland, weight loss, change in appetite, nervousness, anxiety, irritability, tremor, sweating, heat intolerance, and difficulty sleeping. She also noticed bulging of her Lt eye. Pt doesn't smoke     Reports positive FH of thyroid disease in her mother     Admission labs:   Latest Reference Range & Units 11/22/22 21:34   TSH 0.270 - 4.200 uIU/mL <0.010 (L)   T4 Free 0.93 - 1.70 ng/dL 7.03 (H)     Pt received 200 mg of PTU then started on 100 mg Q6hrs, Cortef 100 mh iv Q8hr, Propranolol 60 mg Q6hrs     Past Medical History   Past Medical History:   Diagnosis Date    Asthma        Past Surgical History   No past surgical history on file. Social history   Tobacco:   reports that she has never smoked. She has never used smokeless tobacco.  Alcohol:   reports that she does not currently use alcohol. Drugs:   reports current drug use. Drug: Marijuana Dietra Due).   Family history   Family History   Problem Relation Age of Onset    Thyroid Disease Mother     Thyroid Disease Maternal Grandmother        Allergies and Drug reactions  No Known Allergies    Scheduled Meds:   propylthiouracil  100 mg Oral Q6H    hydrocortisone (SOLU-CORTEF) IVPB  100 mg IntraVENous 3 times per day    enoxaparin 40 mg SubCUTAneous Daily    propranolol  60 mg Oral Q6H     PRN Meds:   ondansetron, 4 mg, Q6H PRN  acetaminophen, 650 mg, Q4H PRN    Review of Systems  All systems reviewed. All negative except for symptoms mentioned in HPI     OBJECTIVE    /83   Pulse (!) 101   Temp 98.7 °F (37.1 °C) (Oral)   Resp 16   SpO2 99%   Wt Readings from Last 6 Encounters:   11/22/22 164 lb (74.4 kg)       Physical examination:  General: awake alert, oriented x3, no abnormal position or movements. HEENT: normocephalic non traumatic, +  exophthalmos, +  lid lag, +  lid retraction   Neck: supple, no LN enlargement,  no thyroid tenderness, +  thyroid bruit, no JVD. Pulm: good equal air entry no added sounds   CVS: S1 + S2   Abd: soft lax, no tenderness,   Skin: warm, no lesions, no rash.  No palmar erythema, no onycholysis, no pretibial Myxoedema,   Musculoskeletal: No back tenderness, no kyphosis/scoliosis    Neuro: CN intact, sensation notmal , muscle power normal. +  tremors   Psych: normal mood, and affect    Review of Laboratory Data:  I personally reviewed the following labs:  Recent Labs     11/22/22 2134   WBC 9.8   RBC 4.88   HGB 12.6   HCT 37.5   MCV 76.8*   MCH 25.8*   MCHC 33.6   RDW 13.1      MPV 11.0       Recent Labs     11/22/22 2134 11/23/22  1058    138   K 2.7* 3.8    108*   CO2 19* 20*   BUN 12 6   CREATININE 0.4* 0.3*   GLUCOSE 96 138*   CALCIUM 10.0 9.8   PROT 7.6  --    LABALBU 3.9  --    BILITOT 0.4  --    ALKPHOS 123*  --    AST 17  --    ALT 18  --        No results found for: BHYDRXBUT  No results found for: LABA1C  Lab Results   Component Value Date/Time    TSH <0.010 (L) 11/22/2022 09:34 PM    T4FREE 7.03 (H) 11/22/2022 09:34 PM     Lab Results   Component Value Date/Time    GLUCOSE 138 11/23/2022 10:58 AM    GLUCOSE 87 03/05/2011 12:05 PM     Lab Results   Component Value Date/Time    TRIG 52 03/05/2011 12:05 PM    HDL 39.0 03/05/2011 12:05 PM    LDLCALC 108 03/05/2011 12:05 PM CHOL 157 03/05/2011 12:05 PM       Blood culture   No results found for: [facility]    Radiology:  No orders to display       Medical Records/Labs/Images Review:   I personally reviewed and summarized previous records   All labs and imaging were reviewed independently    Joaquin [facility] Kyleigh JAE Dodson, a 20 y.o.-old female seen in for management of thyrotoxicosis     Sever Thyrotoxicosis  According to [address], pt has sever hyperthyroidism but not on thyroid storm   TSH < 0.01, fT4 7.03   For now and while inpatient  we recommend the following    mg Q6hrs for today (to Methimazole 20 mg BID starting from tomorrow morning)    Hydrocortisone 40 mg po BID while inpatient (will stop on discharge)   Continue propanolol 60 PO q6 (change to extended release 60 mg daily on discharge)   Daily free T4 while inpatient   Monitor vitals for signs        Hypokalemia   Replaced     Interdisciplinary plan for communication with healthcare providers:   Consult recommendations were discussed with the Primary Service/Nursing staff      The above issues were reviewed with the patient who understood and agreed with the plan. Thank you for allowing us to participate in the care of this patient. Please do not hesitate to contact us with any additional questions. Charlee Garcia MD  Endocrinologist, [facility] - BEHAVIORAL HEALTH SERVICES Diabetes Care and Endocrinology   [address] [address]   Phone: [phone]  Fax: [phone]  ---------------------------------  An electronic signature was used to authenticate this note.  Lidia Lancaster MD on 11/23/2022 at 7:53 PM

## 2022-11-24 NOTE — DISCHARGE SUMMARY
[facility] Physician Discharge Summary       Francesca Hernadez MD  [address] [facility] [address]  [phone]    Follow up on 12/1/2022  Endocrine follow up visit, Thursday 12/1 at 1:00pm      Activity level: As tolerated     Dispo: Home      Condition on discharge: Stable     Patient ID:  Marylu Lopez  [id number]  20 y.o.  2000    Admit date: 11/23/2022    Discharge date and time:  11/24/2022  1:14 PM    Admission Diagnoses: Principal Problem:    Other thyrotoxicosis without thyrotoxic crisis or storm  Resolved Problems:    * No resolved hospital problems. *      Discharge Diagnoses: Principal Problem:    Other thyrotoxicosis without thyrotoxic crisis or storm  Resolved Problems:    * No resolved hospital problems. *      Consults:  IP CONSULT TO ENDOCRINOLOGY  IP CONSULT TO IV TEAM    Hospital Course:   Patient Marylu Lopez is a 20 y.o. presented with Thyrotoxicosis, acute [E05.90]    Presented to ED with several month history of palpitations, unintentional weight loss, exophthalmos and increased perspiration. ED work-up indicated TSH < 0.010, FT4 elevated > 7, total T4 23.9  She was admitted for treatment of symptomatic overt hyperthyroidism    She was provided Propranolol, Hydrocortisone and PTU. Dr Agueda Gil consulted. Thyroid sonogram shows hypervascular and heterogenous thyroid gland. Her symptoms all improved - her FT4 began to decline. She is discharged home on Propranolol 60 mg daily and Methimazole 20 BID.     Plan to follow up with Dr Agueda Gil as outpatient for further management    Discharge Exam:    General Appearance: alert and oriented to person, place and time and in no acute distress  Skin: warm and dry  Head: normocephalic and atraumatic  Eyes: pupils equal, round, and reactive to light, extraocular eye movements intact, conjunctivae normal  Neck: neck supple and non tender without mass   Pulmonary/Chest: clear to auscultation bilaterally- no wheezes, rales or rhonchi, normal air movement, no respiratory distress  Cardiovascular: normal rate, normal S1 and S2 and no carotid bruits  Abdomen: soft, non-tender, non-distended, normal bowel sounds, no masses or organomegaly  Extremities: no cyanosis, no clubbing and no edema  Neurologic: no cranial nerve deficit and speech normal    I/O last 3 completed shifts: In: 128.2 [IV Piggyback:128.2]  Out: 300 [Urine:300]  No intake/output data recorded. LABS:  Recent Labs     11/22/22 2134 11/23/22  1058 11/24/22  0245    138 138   K 2.7* 3.8 3.7    108* 105   CO2 19* 20* 21*   BUN 12 6 7   CREATININE 0.4* 0.3* 0.3*   GLUCOSE 96 138* 120*   CALCIUM 10.0 9.8 9.7       Recent Labs     11/22/22 2134 11/24/22  0245   WBC 9.8 3.9*   RBC 4.88 4.45   HGB 12.6 11.8   HCT 37.5 35.2   MCV 76.8* 79.1*   MCH 25.8* 26.5   MCHC 33.6 33.5   RDW 13.1 13.0    244   MPV 11.0 10.9       No results for input(s): POCGLU in the last 72 hours. Imaging:  US THYROID    Result Date: 11/23/2022  EXAMINATION: THYROID ULTRASOUND 11/23/2022 COMPARISON: None. HISTORY: ORDERING SYSTEM PROVIDED HISTORY: Hyperthyroidism TECHNOLOGIST PROVIDED HISTORY: Reason for exam:->Hyperthyroidism What reading provider will be dictating this exam?->CRC FINDINGS: Right thyroid lobe:  2.1 x 5.2 x 2.5 cm Left thyroid lobe:  1.8 x 4.7 x 1.8 cm Isthmus:  3 mm Thyroid Gland:  Heterogeneous appearance of the thyroid parenchyma. Increased vascularity throughout. Nodules: There are multiple bilateral thyroid cysts. Largest on the right is a conglomerate of cysts measuring 14 mm. Would be classified as a mixed cystic/solid TR 2 nodule. Largest on the left is 12 mm and is also classified as a TR 2 nodule. Left midpole isoechoic 10 mm TR 3 nodule also present. There are no nodules that currently meet sonographic criteria to recommend FNA nor follow-up. Cervical lymphadenopathy: No abnormal lymph nodes in the imaged portions of the neck. 1.  Heterogeneous, mildly enlarged, and hypervascular thyroid gland. 2.  Bilateral thyroid nodules. No nodules currently meet sonographic criteria to recommend FNA nor follow-up based on the guidelines below. ACR TI-RADS recommendations: TR5 (>= 7 points):  FNA if >= 1 cm; follow-up if 0.5-0.9 cm in 1, 2, 3, 4, and 5 years TR4 (4-6 points):  FNA if >= 1.5 cm; follow-up if 1.0-1.4 cm in 1, 2, 3, and 5 years TR3 (3 points):  FNA if >= 2.5 cm; follow-up if 1.5-2.4 cm in 1, 3, and 5 years TR2 (2 points):  No FNA or follow-up TR1 (0 points):  No FNA or follow-up ACR TI-RADS recommends that no more than two nodules with the highest ACR TI-RADS point total should be biopsied and no more than four nodules should be followed. Patient Instructions:      Medication List        START taking these medications      methIMAzole 10 MG tablet  Commonly known as: TAPAZOLE  Take 2 tablets by mouth 2 times daily     propranolol 60 MG extended release capsule  Commonly known as:  Inderal LA  Take 1 capsule by mouth daily               Where to Get Your Medications        These medications were [facility] [address] [facility] [address] [facility] [place] [address]      Phone: [phone]   methIMAzole 10 MG tablet  propranolol 60 MG extended release capsule           Note that more than 30 minutes was spent in preparing discharge papers, discussing discharge with patient, medication review, etc.    Signed:  Electronically signed by DALLAS Mcdaniel CNP on 11/24/2022 at 1:14 PM

## 2022-12-01 ENCOUNTER — OFFICE VISIT (OUTPATIENT)
Dept: ENDOCRINOLOGY | Age: 22
End: 2022-12-01

## 2022-12-01 VITALS
DIASTOLIC BLOOD PRESSURE: 88 MMHG | SYSTOLIC BLOOD PRESSURE: 138 MMHG | BODY MASS INDEX: 37.34 KG/M2 | OXYGEN SATURATION: 99 % | WEIGHT: 166 LBS | HEART RATE: 87 BPM | RESPIRATION RATE: 18 BRPM | HEIGHT: 56 IN

## 2022-12-01 DIAGNOSIS — E05.90 HYPERTHYROIDISM: ICD-10-CM

## 2022-12-01 DIAGNOSIS — E05.90 HYPERTHYROIDISM: Primary | ICD-10-CM

## 2022-12-01 LAB
T3 TOTAL: 341.1 NG/DL (ref 80–200)
T4 FREE: 2.82 NG/DL (ref 0.93–1.7)
TSH SERPL DL<=0.05 MIU/L-ACNC: <0.01 UIU/ML (ref 0.27–4.2)

## 2022-12-01 PROCEDURE — 99214 OFFICE O/P EST MOD 30 MIN: CPT | Performed by: INTERNAL MEDICINE

## 2022-12-01 NOTE — PROGRESS NOTES
[address] [place] [address] Department of Endocrinology Diabetes and Metabolism   [address] [facility] [address]   Phone: [phone]  Fax: [phone]       Date of Service: 12/1/2022  Primary Care Physician: No primary care provider on file. provider: Lilia Nova MD     Reason for the consultation:   Graves' disease     History of Present Illness: The history is provided by the patient. Accuracy of the patient data is excellent. Kathy Loomis is a very pleasant 20 y.o. old female seen today for follow up on graves's disease    Pt was admitted to [address] [place] [address] on 11/23/2022  because of palpitations and found to have sever hyperthyroidism     She was started on Methimazole and currently on Methimazole  20 mg BID and Propranolol er 60 mg daily     She also noticed bulging of her Lt eye. Pt doesn't smoke     Reports positive FH of thyroid disease in her mother     Admission labs:   Latest Reference Range & Units 11/22/22 21:34   TSH 0.270 - 4.200 uIU/mL <0.010 (L)   T4 Free 0.93 - 1.70 ng/dL 7.03 (H)     Lab Results   Component Value Date/Time    TSI 6.73 (H) 11/24/2022 02:45 AM    TPOABS <4.0 11/24/2022 02:45 AM     Past Medical History   Past Medical History:   Diagnosis Date    Asthma        Past Surgical History   No past surgical history on file. Social history   Tobacco:   reports that she has never smoked. She has never used smokeless tobacco.  Alcohol:   reports that she does not currently use alcohol. Drugs:   reports current drug use. Drug: Marijuana [place]).   Family history   Family History   Problem Relation Age of Onset    Thyroid Disease Mother     Thyroid Disease Maternal Grandmother        Allergies and Drug reactions  No Known Allergies    Current Outpatient Medications   Medication Sig Dispense Refill    methIMAzole (TAPAZOLE) 10 MG tablet Take 2 tablets by mouth 2 times daily 120 tablet 1    propranolol (INDERAL LA) 60 MG extended release capsule Take 1 capsule by mouth daily 30 capsule 3     No current facility-administered medications for this visit. Review of Systems  All systems reviewed. All negative except for symptoms mentioned in HPI     OBJECTIVE    /88   Pulse 87   Resp 18   Ht 4' 8\" (1.422 m)   Wt 166 lb (75.3 kg)   SpO2 99%   BMI 37.22 kg/m²   Wt Readings from Last 6 Encounters:   12/01/22 166 lb (75.3 kg)   11/24/22 167 lb 8 oz (76 kg)   11/22/22 164 lb (74.4 kg)       Physical examination:  General: awake alert, oriented x3, no abnormal position or movements. HEENT: normocephalic non traumatic, +  exophthalmos, +  lid lag, +  lid retraction   Neck: supple, no LN enlargement,  no thyroid tenderness, +  thyroid bruit, no JVD. Pulm: good equal air entry no added sounds   CVS: S1 + S2   Abd: soft lax, no tenderness,   Skin: warm, no lesions, no rash.  No palmar erythema, no onycholysis, no pretibial Myxoedema,   Musculoskeletal: No back tenderness, no kyphosis/scoliosis    Neuro: CN intact, sensation notmal , muscle power normal. +  tremors   Psych: normal mood, and affect    Review of Laboratory Data:  I personally reviewed the following labs:  Lab Results   Component Value Date/Time    WBC 3.9 (L) 11/24/2022 02:45 AM    RBC 4.45 11/24/2022 02:45 AM    HGB 11.8 11/24/2022 02:45 AM    HCT 35.2 11/24/2022 02:45 AM    MCV 79.1 (L) 11/24/2022 02:45 AM    MCH 26.5 11/24/2022 02:45 AM    MCHC 33.5 11/24/2022 02:45 AM    RDW 13.0 11/24/2022 02:45 AM     11/24/2022 02:45 AM    MPV 10.9 11/24/2022 02:45 AM      Lab Results   Component Value Date/Time     11/24/2022 02:45 AM    K 3.7 11/24/2022 02:45 AM    CO2 21 (L) 11/24/2022 02:45 AM    BUN 7 11/24/2022 02:45 AM    CREATININE 0.3 (L) 11/24/2022 02:45 AM    CALCIUM 9.7 11/24/2022 02:45 AM    LABGLOM >60 11/24/2022 02:45 AM      Lab Results   Component Value Date/Time    TSH <0.010 (L) 12/01/2022 01:28 PM    T4FREE 2.82 (H) 12/01/2022 01:28 PM    O3OTBYX 23.9 (H) 11/24/2022 02:45 AM    Y5QZXZB 341.10 (H) 12/01/2022 01:28 PM    TSI 6.73 (H) 11/24/2022 02:45 AM    TPOABS <4.0 11/24/2022 02:45 AM     Lab Results   Component Value Date/Time    LABA1C 5.4 11/24/2022 02:45 AM    GLUCOSE 120 11/24/2022 02:45 AM    GLUCOSE 87 03/05/2011 12:05 PM     Lab Results   Component Value Date/Time    LABA1C 5.4 11/24/2022 02:45 AM     Lab Results   Component Value Date/Time    TRIG 52 03/05/2011 12:05 PM    HDL 39.0 03/05/2011 12:05 PM    LDLCALC 108 03/05/2011 12:05 PM    CHOL 157 03/05/2011 12:05 PM     No results found for: [id number] Manisha WHATLEY, a 20 y.o.-old female seen in for management of thyrotoxicosis     Sever Thyrotoxicosis  Due to graves's disease   Currently on Methimazole 20 mg BID, Propranolol er 60 mg daily   Check TFT now and adjust the dose if needed   Reviewed potential side effects of Methimazole, including agranulocytosis and liver dysfunction (cholestasis). vitD deficiency   Encouraged vitD supplement   Discussed the effect of hyperthyroidism on bone health     Interdisciplinary plan for communication with healthcare providers:   Consult recommendations were discussed with the Primary Service/Nursing staff      The above issues were reviewed with the patient who understood and agreed with the plan. Thank you for allowing us to participate in the care of this patient. Please do not hesitate to contact us with any additional questions. Shannen Andrade MD  Endocrinologist, [facility] - BEHAVIORAL HEALTH SERVICES Diabetes Care and Endocrinology   [address] [facility] [address]   Phone: [phone]  Fax: [phone]  ---------------------------------  An electronic signature was used to authenticate this note.  Dalila Flores MD on 12/1/2022 at 1:08 PM

## 2022-12-04 ENCOUNTER — TELEPHONE (OUTPATIENT)
Dept: ENDOCRINOLOGY | Age: 22
End: 2022-12-04

## 2022-12-04 DIAGNOSIS — E05.90 HYPERTHYROIDISM: Primary | ICD-10-CM

## 2022-12-04 NOTE — TELEPHONE ENCOUNTER
Endocrine staff,   Please notify pt that I have reviewed your recent results.      Thyroid hormones are better    STOP Propranolol   Decrease Methimazole from 20 mg BID to 10 mg BID and recheck thyroid level again this Friday

## 2022-12-09 DIAGNOSIS — E05.90 HYPERTHYROIDISM: ICD-10-CM

## 2022-12-09 LAB
T4 FREE: 2.83 NG/DL (ref 0.93–1.7)
T4 TOTAL: 17.7 MCG/DL (ref 4.5–11.7)
TSH SERPL DL<=0.05 MIU/L-ACNC: <0.01 UIU/ML (ref 0.27–4.2)

## 2022-12-11 ENCOUNTER — TELEPHONE (OUTPATIENT)
Dept: ENDOCRINOLOGY | Age: 22
End: 2022-12-11

## 2022-12-11 DIAGNOSIS — E05.90 HYPERTHYROIDISM: Primary | ICD-10-CM

## 2022-12-12 NOTE — TELEPHONE ENCOUNTER
Endocrine staff,   Please notify pt that I have reviewed your recent results.      Thyroid hormones are still high and no significant change comparing from previous result    It seems that Methimazole 10 mg BID is not enough, will change to 15 mg BID and recheck level in 3-4 weeks     Please update her prescription

## 2023-01-26 ENCOUNTER — OFFICE VISIT (OUTPATIENT)
Dept: ENDOCRINOLOGY | Age: 23
End: 2023-01-26
Payer: COMMERCIAL

## 2023-01-26 VITALS
HEART RATE: 71 BPM | HEIGHT: 56 IN | DIASTOLIC BLOOD PRESSURE: 91 MMHG | OXYGEN SATURATION: 99 % | WEIGHT: 175 LBS | SYSTOLIC BLOOD PRESSURE: 147 MMHG | BODY MASS INDEX: 39.37 KG/M2

## 2023-01-26 DIAGNOSIS — E55.9 VITAMIN D DEFICIENCY: ICD-10-CM

## 2023-01-26 DIAGNOSIS — E05.90 HYPERTHYROIDISM: Primary | ICD-10-CM

## 2023-01-26 DIAGNOSIS — E05.90 HYPERTHYROIDISM: ICD-10-CM

## 2023-01-26 DIAGNOSIS — E05.00 GRAVES DISEASE: ICD-10-CM

## 2023-01-26 LAB
T3 FREE: 1.8 PG/ML (ref 2–4.4)
T4 FREE: 0.55 NG/DL (ref 0.93–1.7)
TSH SERPL DL<=0.05 MIU/L-ACNC: 0.02 UIU/ML (ref 0.27–4.2)
VITAMIN D 25-HYDROXY: 34 NG/ML (ref 30–100)

## 2023-01-26 PROCEDURE — 99213 OFFICE O/P EST LOW 20 MIN: CPT | Performed by: CLINICAL NURSE SPECIALIST

## 2023-01-26 RX ORDER — METHIMAZOLE 10 MG/1
TABLET ORAL
Qty: 60 TABLET | Refills: 3 | Status: SHIPPED
Start: 2023-01-26 | End: 2023-03-16 | Stop reason: SDUPTHER

## 2023-01-26 RX ORDER — PROPRANOLOL HCL 60 MG
60 CAPSULE, EXTENDED RELEASE 24HR ORAL
COMMUNITY
Start: 2023-01-02

## 2023-01-26 NOTE — PROGRESS NOTES
[address] [place] [address] Department of Endocrinology Diabetes and Metabolism   [address] [facility] [address]   Phone: [phone]  Fax: [phone]       Date of Service: 1/26/2023  Primary Care Physician: No primary care provider on file. provider: Juani Maloney MD     Reason for the consultation:   Graves' disease     History of Present Illness: The history is provided by the patient. Accuracy of the patient data is excellent. Elsi Luo is a very pleasant 24 y.o. old female seen today for follow up on graves's disease    Pt was admitted to [facility] on 11/23/2022  because of palpitations and found to have severe hyperthyroidism     She was started on Methimazole and currently on Methimazole  15 mg BID  Pt denies heart palpitations, excessive sweating, weight loss, feeling hotter than usual   Complains of intermittent diarrhea, and intermittent tremors    She also noticed bulging of her Lt eye. Pt doesn't smoke   She has appointment with ophthalmology at the end of this month    Reports positive FH of thyroid disease in her mother     Admission labs:   Latest Reference Range & Units 11/22/22 21:34   TSH 0.270 - 4.200 uIU/mL <0.010 (L)   T4 Free 0.93 - 1.70 ng/dL 7.03 (H)     Lab Results   Component Value Date/Time    TSI 6.73 (H) 11/24/2022 02:45 AM    TPOABS <4.0 11/24/2022 02:45 AM     Lab Results   Component Value Date    TSH <0.010 (L) 12/09/2022    A7TRUJD 341.10 (H) 12/01/2022    M7DLHXE 17.7 (H) 12/09/2022    T4FREE 2.83 (H) 12/09/2022       Past Medical History   Past Medical History:   Diagnosis Date    Asthma        Past Surgical History   No past surgical history on file. Social history   Tobacco:   reports that she has never smoked. She has never used smokeless tobacco.  Alcohol:   reports that she does not currently use alcohol. Drugs:   reports current drug use. Drug: Marijuana Nory Sebastian).   Family history   Family History   Problem Relation Age of Onset    Thyroid Disease Mother     Thyroid Disease Maternal Grandmother        Allergies and Drug reactions  No Known Allergies    Current Outpatient Medications   Medication Sig Dispense Refill    propranolol (INDERAL LA) 60 MG extended release capsule 60 mg      methIMAzole (TAPAZOLE) 10 MG tablet Take 2 tablets by mouth 2 times daily (Patient taking differently: Take 15 mg by mouth 2 times daily) 120 tablet 1     No current facility-administered medications for this visit. Review of Systems  All systems reviewed. All negative except for symptoms mentioned in HPI     OBJECTIVE    BP (!) 147/91   Pulse 71   Ht 4' 8\" (1.422 m)   Wt 175 lb (79.4 kg)   SpO2 99%   BMI 39.23 kg/m²   Wt Readings from Last 6 Encounters:   01/26/23 175 lb (79.4 kg)   12/01/22 166 lb (75.3 kg)   11/24/22 167 lb 8 oz (76 kg)   11/22/22 164 lb (74.4 kg)       Physical examination:  General: awake alert, oriented x3, no abnormal position or movements. HEENT: normocephalic non traumatic, +  exophthalmos, +  lid lag, +  lid retraction   Neck: supple, no LN enlargement,  no thyroid tenderness, +  thyroid bruit, no JVD. Pulm: good equal air entry no added sounds   CVS: S1 + S2   Abd: soft lax, no tenderness,   Skin: warm, no lesions, no rash.  No palmar erythema, no onycholysis, no pretibial Myxoedema,   Musculoskeletal: No back tenderness, no kyphosis/scoliosis    Neuro: CN intact, sensation notmal , muscle power normal. +  tremors   Psych: normal mood, and affect    Review of Laboratory Data:  I personally reviewed the following labs:  Lab Results   Component Value Date/Time    WBC 3.9 (L) 11/24/2022 02:45 AM    RBC 4.45 11/24/2022 02:45 AM    HGB 11.8 11/24/2022 02:45 AM    HCT 35.2 11/24/2022 02:45 AM    MCV 79.1 (L) 11/24/2022 02:45 AM    MCH 26.5 11/24/2022 02:45 AM    MCHC 33.5 11/24/2022 02:45 AM    RDW 13.0 11/24/2022 02:45 AM     11/24/2022 02:45 AM    MPV 10.9 11/24/2022 02:45 AM      Lab Results   Component Value Date/Time    NA 138 11/24/2022 02:45 AM    K 3.7 11/24/2022 02:45 AM    CO2 21 (L) 11/24/2022 02:45 AM    BUN 7 11/24/2022 02:45 AM    CREATININE 0.3 (L) 11/24/2022 02:45 AM    CALCIUM 9.7 11/24/2022 02:45 AM    LABGLOM >60 11/24/2022 02:45 AM      Lab Results   Component Value Date/Time    TSH <0.010 (L) 12/09/2022 03:37 PM    T4FREE 2.83 (H) 12/09/2022 03:37 PM    F6ZAJRL 17.7 (H) 12/09/2022 03:37 PM    P4GDFZF 341.10 (H) 12/01/2022 01:28 PM    TSI 6.73 (H) 11/24/2022 02:45 AM    TPOABS <4.0 11/24/2022 02:45 AM     Lab Results   Component Value Date/Time    LABA1C 5.4 11/24/2022 02:45 AM    GLUCOSE 120 11/24/2022 02:45 AM    GLUCOSE 87 03/05/2011 12:05 PM     Lab Results   Component Value Date/Time    LABA1C 5.4 11/24/2022 02:45 AM     Lab Results   Component Value Date/Time    TRIG 52 03/05/2011 12:05 PM    HDL 39.0 03/05/2011 12:05 PM    LDLCALC 108 03/05/2011 12:05 PM    CHOL 157 03/05/2011 12:05 PM     No results found for: VITD25    ASSESSMENT & RECOMMENDATIONS   Reesejessica Chet, a 24 y.o.-old female seen in for management of thyrotoxicosis     Hyperthyroidism/Graves' disease  Due to graves's disease   Currently on Methimazole 15 mg BID  Check TFT now and adjust the dose if needed   Reviewed potential side effects of Methimazole, including agranulocytosis and liver dysfunction (cholestasis). vitD deficiency   Encouraged vitD supplement   Discussed the effect of hyperthyroidism on bone health   Will assess vitamin D    Graves' eye disease  Has an appointment with ophthalmology at the end of this month    The above issues were reviewed with the patient who understood and agreed with the plan. Thank you for allowing us to participate in the care of this patient. Please do not hesitate to contact us with any additional questions.      DALLAS Tom - CNS  [facility] - BEHAVIORAL HEALTH SERVICES Diabetes Care and Endocrinology   [address] [facility] [address]   Phone: [phone]  Fax: [phone]  ---------------------------------  An electronic signature was used to authenticate this note.  Evi Henderson, APRN - CNS   on 1/26/2023 at 11:57 AM

## 2023-01-27 RX ORDER — METHIMAZOLE 10 MG/1
TABLET ORAL
Qty: 180 TABLET | OUTPATIENT
Start: 2023-01-27

## 2023-01-30 ENCOUNTER — TELEPHONE (OUTPATIENT)
Dept: ENDOCRINOLOGY | Age: 23
End: 2023-01-30

## 2023-01-30 NOTE — TELEPHONE ENCOUNTER
----- Message from DALLAS Robledo sent at 1/26/2023  4:03 PM EST -----  Please call patient and inform her thyroid function testing is showing overtreatment. Please have patient hold methimazole for 3 days and then restart methimazole at lower dose of 10 mg 1 tablet twice daily  Recheck labs in 4 weeks.   Labs ordered

## 2023-03-16 DIAGNOSIS — E05.90 HYPERTHYROIDISM: Primary | ICD-10-CM

## 2023-03-16 DIAGNOSIS — E05.90 HYPERTHYROIDISM: ICD-10-CM

## 2023-03-16 DIAGNOSIS — E05.00 GRAVES DISEASE: ICD-10-CM

## 2023-03-16 RX ORDER — METHIMAZOLE 10 MG/1
TABLET ORAL
Qty: 60 TABLET | Refills: 5 | Status: SHIPPED | OUTPATIENT
Start: 2023-03-16

## 2023-03-20 RX ORDER — METHIMAZOLE 10 MG/1
TABLET ORAL
Qty: 180 TABLET | Refills: 5 | Status: SHIPPED | OUTPATIENT
Start: 2023-03-20

## 2023-04-26 ENCOUNTER — OFFICE VISIT (OUTPATIENT)
Dept: ENDOCRINOLOGY | Age: 23
End: 2023-04-26
Payer: COMMERCIAL

## 2023-04-26 ENCOUNTER — TELEPHONE (OUTPATIENT)
Dept: ENDOCRINOLOGY | Age: 23
End: 2023-04-26

## 2023-04-26 VITALS
DIASTOLIC BLOOD PRESSURE: 82 MMHG | HEIGHT: 56 IN | OXYGEN SATURATION: 99 % | BODY MASS INDEX: 40.49 KG/M2 | WEIGHT: 180 LBS | HEART RATE: 77 BPM | SYSTOLIC BLOOD PRESSURE: 136 MMHG

## 2023-04-26 DIAGNOSIS — E05.90 HYPERTHYROIDISM: ICD-10-CM

## 2023-04-26 DIAGNOSIS — E05.90 HYPERTHYROIDISM: Primary | ICD-10-CM

## 2023-04-26 DIAGNOSIS — E05.00 GRAVES DISEASE: ICD-10-CM

## 2023-04-26 DIAGNOSIS — E55.9 VITAMIN D DEFICIENCY: ICD-10-CM

## 2023-04-26 LAB
T3FREE SERPL-MCNC: 2.1 PG/ML (ref 2–4.4)
T4 FREE SERPL-MCNC: 0.7 NG/DL (ref 0.93–1.7)
TSH SERPL-MCNC: 2.05 UIU/ML (ref 0.27–4.2)

## 2023-04-26 PROCEDURE — 99213 OFFICE O/P EST LOW 20 MIN: CPT | Performed by: CLINICAL NURSE SPECIALIST

## 2023-04-26 RX ORDER — METHIMAZOLE 10 MG/1
TABLET ORAL
Qty: 180 TABLET | Refills: 5
Start: 2023-04-26

## 2023-04-26 NOTE — TELEPHONE ENCOUNTER
----- Message from DALLAS Cummins sent at 4/26/2023  3:54 PM EDT -----  Please call patient and inform her I reviewed thyroid function testing. Thyroid function testing is showing overtreatment on methimazole. Please have patient decrease methimazole and take 10 mg am and 1/2 tablet evening for total of 15 mg.   Recehck labs in 6 weeks

## 2023-04-26 NOTE — PROGRESS NOTES
11/24/2022 02:45 AM    CREATININE 0.3 (L) 11/24/2022 02:45 AM    CALCIUM 9.7 11/24/2022 02:45 AM    LABGLOM >60 11/24/2022 02:45 AM      Lab Results   Component Value Date/Time    TSH 0.021 (L) 01/26/2023 11:57 AM    T4FREE 0.55 (L) 01/26/2023 11:57 AM    M8JZCJX 17.7 (H) 12/09/2022 03:37 PM    FT3 1.8 (L) 01/26/2023 11:57 AM    E6IELSG 341.10 (H) 12/01/2022 01:28 PM    TSI 6.73 (H) 11/24/2022 02:45 AM    TPOABS <4.0 11/24/2022 02:45 AM     Lab Results   Component Value Date/Time    LABA1C 5.4 11/24/2022 02:45 AM    GLUCOSE 120 11/24/2022 02:45 AM    GLUCOSE 87 03/05/2011 12:05 PM     Lab Results   Component Value Date/Time    LABA1C 5.4 11/24/2022 02:45 AM     Lab Results   Component Value Date/Time    TRIG 52 03/05/2011 12:05 PM    HDL 39.0 03/05/2011 12:05 PM    LDLCALC 108 03/05/2011 12:05 PM    CHOL 157 03/05/2011 12:05 PM     Lab Results   Component Value Date/Time    VITD25 34 01/26/2023 11:57 AM       ASSESSMENT & RECOMMENDATIONS   Zurdo Rangel, a 24 y.o.-old female seen in for management of thyrotoxicosis     Hyperthyroidism/Graves' disease  Due to graves's disease   Currently on Methimazole 10 mg BID  Check TFT now and adjust the dose if needed   Reviewed potential side effects of Methimazole, including agranulocytosis and liver dysfunction (cholestasis). vitD deficiency   Encouraged vitD supplement   Discussed the effect of hyperthyroidism on bone health   Last vitamin D WNL     Graves' eye disease  Follows with ophthalmology     The above issues were reviewed with the patient who understood and agreed with the plan. Thank you for allowing us to participate in the care of this patient. Please do not hesitate to contact us with any additional questions. Lisa Dolan, APRN - CNS  [facility] [place] [facility] Diabetes Care and Endocrinology   [address] [address]   Phone: [phone]  Fax: [phone]  ---------------------------------  An electronic signature was used to authenticate this note.

## 2023-08-31 ENCOUNTER — OFFICE VISIT (OUTPATIENT)
Dept: ENDOCRINOLOGY | Age: 23
End: 2023-08-31
Payer: COMMERCIAL

## 2023-08-31 VITALS
RESPIRATION RATE: 16 BRPM | HEIGHT: 56 IN | BODY MASS INDEX: 41.39 KG/M2 | HEART RATE: 91 BPM | SYSTOLIC BLOOD PRESSURE: 160 MMHG | DIASTOLIC BLOOD PRESSURE: 108 MMHG | WEIGHT: 184 LBS

## 2023-08-31 DIAGNOSIS — E05.90 HYPERTHYROIDISM: ICD-10-CM

## 2023-08-31 DIAGNOSIS — E05.00 GRAVES DISEASE: ICD-10-CM

## 2023-08-31 DIAGNOSIS — E55.9 VITAMIN D DEFICIENCY: ICD-10-CM

## 2023-08-31 DIAGNOSIS — E05.90 HYPERTHYROIDISM: Primary | ICD-10-CM

## 2023-08-31 LAB
T3 FREE: 3.04 PG/ML (ref 2–4.4)
T4 FREE: 1.4 NG/DL (ref 0.9–1.7)
TSH SERPL DL<=0.05 MIU/L-ACNC: 0.89 UIU/ML (ref 0.27–4.2)

## 2023-08-31 PROCEDURE — 99213 OFFICE O/P EST LOW 20 MIN: CPT | Performed by: CLINICAL NURSE SPECIALIST

## 2023-08-31 NOTE — PROGRESS NOTES
[facility] Department of Endocrinology Diabetes and Metabolism   [address] [place] [id number]   Phone: [phone]  Fax: [phone]       Date of Service: 8/31/2023  Primary Care Physician: No primary care provider on file. provider:DALLAS Zamora - CNS      Reason for the consultation:   Graves' disease     History of Present Illness: The history is provided by the patient. Accuracy of the patient data is excellent. Kali Yousif is a very pleasant 24 y.o. old female seen today for follow up on graves's disease    Pt was admitted to [facility] on 11/23/2022  because of palpitations and found to have severe hyperthyroidism     She was started on Methimazole and currently on Methimazole  10 mg 1 tablet dailty  Pt denies heart palpitations, excessive sweating, weight loss, feeling hotter than usual   Complains of intermittent diarrhea, and intermittent tremors    She also noticed bulging of her Lt eye. Pt doesn't smoke   Follows with ophthalmology for graves eye disease     Reports positive FH of thyroid disease in her mother     Admission labs:   Latest Reference Range & Units 11/22/22 21:34   TSH 0.270 - 4.200 uIU/mL <0.010 (L)   T4 Free 0.93 - 1.70 ng/dL 7.03 (H)     Lab Results   Component Value Date/Time    TSI 6.73 (H) 11/24/2022 02:45 AM    TPOABS <4.0 11/24/2022 02:45 AM     Lab Results   Component Value Date    TSH 2.050 04/26/2023    X8HPEOE 341.10 (H) 12/01/2022    B3GIDZU 17.7 (H) 12/09/2022    FT3 2.1 04/26/2023    T4FREE 0.70 (L) 04/26/2023       Past Medical History   Past Medical History:   Diagnosis Date    Asthma        Past Surgical History   No past surgical history on file. Social history   Tobacco:   reports that she has never smoked. She has never used smokeless tobacco.  Alcohol:   reports that she does not currently use alcohol. Drugs:   reports current drug use. Drug: Marijuana [facility]).   Family history   Family History   Problem Relation

## 2023-09-14 ENCOUNTER — TELEPHONE (OUTPATIENT)
Dept: ENDOCRINOLOGY | Age: 23
End: 2023-09-14

## 2023-09-14 NOTE — TELEPHONE ENCOUNTER
----- Message from DALLAS Stanton - CNS sent at 9/5/2023  8:35 AM EDT -----  Please call patient and inform her labs are normal.  This is an excellent result.   Continue same dose of methimazole for now

## 2024-03-19 DIAGNOSIS — E05.00 GRAVES DISEASE: ICD-10-CM

## 2024-03-19 DIAGNOSIS — E05.90 HYPERTHYROIDISM: ICD-10-CM

## 2024-03-19 RX ORDER — METHIMAZOLE 10 MG/1
TABLET ORAL
Qty: 180 TABLET | Refills: 5 | Status: SHIPPED | OUTPATIENT
Start: 2024-03-19